# Patient Record
(demographics unavailable — no encounter records)

---
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
## 2025-05-02 NOTE — PHYSICAL EXAM
[No Acute Distress] : no acute distress [Well-Appearing] : well-appearing [Non Tender] : non-tender [Non-distended] : non-distended [Normal Bowel Sounds] : normal bowel sounds [Coordination Grossly Intact] : coordination grossly intact [Normal Gait] : normal gait [Normal] : affect was normal and insight and judgment were intact

## 2025-05-02 NOTE — HEALTH RISK ASSESSMENT
[Yes] : Yes [2 - 3 times a week (3 pts)] : 2 - 3  times a week (3 points) [3 or 4 (1 pt)] : 3 or 4  (1 point) [Never (0 pts)] : Never (0 points) [0] : 2) Feeling down, depressed, or hopeless: Not at all (0) [PHQ-2 Negative - No further assessment needed] : PHQ-2 Negative - No further assessment needed [Patient reported PAP Smear was normal] : Patient reported PAP Smear was normal [HIV test declined] : HIV test declined [Hepatitis C test offered] : Hepatitis C test offered [Former] : Former [5-9] : 5-9 [< 15 Years] : < 15 Years [NO] : No [PapSmearComments] : no h/o abn, follows with Gyn
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
## 2025-05-02 NOTE — PLAN
[FreeTextEntry1] : #Anxiety - Behavioral health referral - Continue clomipramine for now - Will contact provider to discuss their choice of this medication prior to making any changes  #HM - Bloodwork today including DM screening - Vaccine appt for tdap, Gardasil - GI and Genetics referrals given FHx of colon cancer - F/u 1 year, sooner if needed

## 2025-06-03 NOTE — ASSESSMENT
[FreeTextEntry1] : 35 yo female with pmh of RICARDO, b12 def, and Vit D def who presents with famhx of colon cancer who wants to discuss her ongoing abd bloating, reflux, intermittent diarrhea/constipation and occasional BRBPR.  #Abdominal bloating #Reflux -Test for h.pylori today -If negative can use pepcid PRN for reflux  -can continue with tums prn -monitor for trigger foods  #abd pain #IBS-mixed (diarrhea/constipation) -Discussed lifestyle modifications including water intake, diet, and exercise -Discussed diet and fiber intake and otc fiber supplementation -Continue w/ Benefiber, and start taking daily  #BRBPR #Change in bowel habits #family history of colon cancer -Plan for outpatient Colonoscopy for BRBPR, R/B/A/L Discussed -Low fiber diet 3-5 days leading up to procedure -CLD the day prior -Golytely ordered to be started at 4pm day prior with split prep -NPO at midnight -Plan to be done at [facility]  Patient seen and discussed with GI Attending Physician Dr. Rachid Najera DO, PhD Gastroenterology Fellow [facility]

## 2025-06-03 NOTE — END OF VISIT
[] : Fellow [FreeTextEntry3] : 34F PMHx RICARDO, b12 def, and Vit D def, famhx of colon cancer presenting with abd bloating, reflux, intermittent diarrhea/constipation and occasional BRBPR. Clinical picture may be consistent with IBS with possible hemorrhoidal bleeding (pt wanted to defer on SARAY today). Given family history of CRC, though in distant relatives, with LLQ abd pain with no improvement with defecation, cannot rule out other endoluminal pathologies. Will plan for colonoscopy at [facility] to further evaluate, GOLytely prep, R/B/A/L discussed, all questions answered. Low fiber diet 3-5 days leading up to colonoscopy. Reviewed most recent BW. Today ordered for celiac serologies and H pylori breath test.  [Time Spent: ___ minutes] : I have spent [unfilled] minutes of time on the encounter which excludes teaching and separately reported services.

## 2025-06-03 NOTE — HISTORY OF PRESENT ILLNESS
[de-identified] : [id number]: Gerd  [FreeTextEntry1] : [id number] - Colonoscopy for change in bowel habits

## 2025-06-03 NOTE — PHYSICAL EXAM
[Alert] : alert [No Acute Distress] : no acute distress [Sclera] : the sclera and conjunctiva were normal [Hearing Threshold Finger Rub Not [place]] : hearing was normal [Oropharynx] : the oropharynx was normal [Normal Appearance] : the appearance of the neck was normal [No Respiratory Distress] : no respiratory distress [Heart Rate And Rhythm] : heart rate was normal and rhythm regular [None] : no edema [Abdomen Tenderness] : non-tender [No Masses] : no abdominal mass palpated [Abdomen Soft] : soft [Abnormal Walk] : normal gait [Normal Color / Pigmentation] : normal skin color and pigmentation [] : no rash [No Focal Deficits] : no focal deficits [Oriented To Time, Place, And Person] : oriented to person, place, and time [Ascites: ___] : no ascites [Rebound Tenderness] : no rebound tenderness [de-identified] : Deferred rectal exam today